# Patient Record
Sex: FEMALE | Race: BLACK OR AFRICAN AMERICAN | NOT HISPANIC OR LATINO | Employment: OTHER | RURAL
[De-identification: names, ages, dates, MRNs, and addresses within clinical notes are randomized per-mention and may not be internally consistent; named-entity substitution may affect disease eponyms.]

---

## 2022-02-02 ENCOUNTER — COMPREHENSIVE EXAM (OUTPATIENT)
Dept: RURAL CLINIC 3 | Facility: CLINIC | Age: 22
End: 2022-02-02

## 2022-02-02 DIAGNOSIS — H52.03: ICD-10-CM

## 2022-02-02 PROCEDURE — 92004 COMPRE OPH EXAM NEW PT 1/>: CPT

## 2022-02-02 PROCEDURE — 92015 DETERMINE REFRACTIVE STATE: CPT

## 2022-02-02 ASSESSMENT — VISUAL ACUITY
OD_SC: 20/30-1
OS_SC: 20/30-1

## 2022-02-02 ASSESSMENT — TONOMETRY
OD_IOP_MMHG: 14
OS_IOP_MMHG: 14

## 2022-05-10 ENCOUNTER — FOLLOW UP (OUTPATIENT)
Dept: RURAL CLINIC 3 | Facility: CLINIC | Age: 22
End: 2022-05-10

## 2022-05-10 DIAGNOSIS — H52.03: ICD-10-CM

## 2022-05-10 PROCEDURE — 92015 DETERMINE REFRACTIVE STATE: CPT

## 2022-05-10 ASSESSMENT — VISUAL ACUITY
OS_CC: 20/20-2
OS_CC: 20/20
OD_CC: 20/20-2
OD_CC: 20/20

## 2022-05-10 NOTE — PATIENT DISCUSSION
MR done today by Dr. Evette Griffiths with no change in Rx. Discussed with patient that her eyes need to get adjusted to the new glasses. Recommend patient to start wearing new glasses when she is reading or anything up close or for patient to wear glasses full time to get adjusted. Continue to monitor.

## 2025-02-26 ENCOUNTER — EMERGENCY VISIT (OUTPATIENT)
Age: 25
End: 2025-02-26

## 2025-02-26 DIAGNOSIS — H52.223: ICD-10-CM

## 2025-02-26 DIAGNOSIS — H52.03: ICD-10-CM

## 2025-02-26 PROCEDURE — S0621AEC ROUTINE OPH EXAM INCLUDES REF/ EST PATIENT
